# Patient Record
Sex: MALE | URBAN - METROPOLITAN AREA
[De-identification: names, ages, dates, MRNs, and addresses within clinical notes are randomized per-mention and may not be internally consistent; named-entity substitution may affect disease eponyms.]

---

## 2022-01-04 ENCOUNTER — OFFICE VISIT (OUTPATIENT)
Dept: URBAN - METROPOLITAN AREA CLINIC 35 | Facility: CLINIC | Age: 27
End: 2022-01-04
Payer: COMMERCIAL

## 2022-01-04 VITALS
HEIGHT: 68 IN | OXYGEN SATURATION: 100 % | HEART RATE: 124 BPM | WEIGHT: 155 LBS | BODY MASS INDEX: 23.49 KG/M2 | SYSTOLIC BLOOD PRESSURE: 128 MMHG | DIASTOLIC BLOOD PRESSURE: 78 MMHG

## 2022-01-04 DIAGNOSIS — R10.84 GENERALIZED ABDOMINAL PAIN: ICD-10-CM

## 2022-01-04 DIAGNOSIS — R19.7 ACUTE DIARRHEA: ICD-10-CM

## 2022-01-04 PROCEDURE — 99243 OFF/OP CNSLTJ NEW/EST LOW 30: CPT | Performed by: INTERNAL MEDICINE

## 2022-01-04 PROCEDURE — 99203 OFFICE O/P NEW LOW 30 MIN: CPT | Performed by: INTERNAL MEDICINE

## 2022-01-04 RX ORDER — CLOMIPRAMINE HYDROCHLORIDE 75 MG/1
1 CAPSULE AT BEDTIME CAPSULE ORAL ONCE A DAY
Status: ACTIVE | COMMUNITY

## 2022-01-04 RX ORDER — DICYCLOMINE HYDROCHLORIDE 20 MG/1
1 TABLET TABLET ORAL THREE TIMES A DAY
Qty: 45 TABLET | Refills: 1 | OUTPATIENT
Start: 2022-01-05 | End: 2022-02-04

## 2022-01-04 NOTE — HPI-ULCERATIVE COLITIS
26 year old male patient presents for ulcerative colitis consult to establish new care. Patient states he was diagnosed in [place] 3 years ago . He denies currently taking medications . He admits trying a medication similar to Mesalamine which he had bad reactions/ side effects with ; also he was on a medication similar to Sulfasalzine along with antiemetic which helped . Patient is currently taking medication for anxiety (clomipramine ) which he states helps frequency of bowel movements .  He admits his last colonoscopy was 2018 revealing  Mild chronic pancolitis . Admits  2-3 bowel movements a day with loose stools . He denies rectal bleeding , melena or mucus in stool.

## 2022-01-04 NOTE — HPI-DYSPEPSIA
Patient complains of belching and gas . Patient admits symptoms are more present during /after  meals . Patient admits symptoms have been present for many years .  Patient admits trying  H2 blocker while in [place]  to relieve symptoms with good success . Patient describes symptoms as excess belching,  pain above belly button, and sometimes regurgitation .   Patient denies having any recent breath test including H. Pylori or SIBO ..

## 2022-01-05 ENCOUNTER — TELEPHONE ENCOUNTER (OUTPATIENT)
Dept: URBAN - METROPOLITAN AREA CLINIC 35 | Facility: CLINIC | Age: 27
End: 2022-01-05

## 2022-01-27 ENCOUNTER — TELEPHONE ENCOUNTER (OUTPATIENT)
Dept: URBAN - METROPOLITAN AREA CLINIC 36 | Facility: CLINIC | Age: 27
End: 2022-01-27

## 2022-01-27 RX ORDER — AMITRIPTYLINE HYDROCHLORIDE 10 MG/1
1 TABLET AT BEDTIME TABLET, FILM COATED ORAL ONCE A DAY
Qty: 30 | Refills: 2 | OUTPATIENT
Start: 2022-01-27

## 2022-01-27 RX ORDER — HYOSCYAMINE SULFATE 0.125 MG
1 TABLET ON THE TONGUE AND ALLOW TO DISSOLVE  AS NEEDED TABLET,DISINTEGRATING ORAL THREE TIMES A DAY
Qty: 15 | Refills: 2 | OUTPATIENT
Start: 2022-01-27

## 2022-02-17 ENCOUNTER — OFFICE VISIT (OUTPATIENT)
Dept: URBAN - METROPOLITAN AREA CLINIC 35 | Facility: CLINIC | Age: 27
End: 2022-02-17

## 2022-02-22 ENCOUNTER — DASHBOARD ENCOUNTERS (OUTPATIENT)
Age: 27
End: 2022-02-22

## 2022-02-22 ENCOUNTER — OFFICE VISIT (OUTPATIENT)
Dept: URBAN - METROPOLITAN AREA CLINIC 35 | Facility: CLINIC | Age: 27
End: 2022-02-22
Payer: COMMERCIAL

## 2022-02-22 VITALS — HEART RATE: 120 BPM | OXYGEN SATURATION: 99 % | HEIGHT: 68 IN | WEIGHT: 155 LBS | BODY MASS INDEX: 23.49 KG/M2

## 2022-02-22 DIAGNOSIS — F41.9 ANXIETY: ICD-10-CM

## 2022-02-22 DIAGNOSIS — K21.9 GASTROESOPHAGEAL REFLUX DISEASE WITHOUT ESOPHAGITIS: ICD-10-CM

## 2022-02-22 DIAGNOSIS — R10.84 GENERALIZED ABDOMINAL PAIN: ICD-10-CM

## 2022-02-22 DIAGNOSIS — K59.1 FUNCTIONAL DIARRHEA: ICD-10-CM

## 2022-02-22 PROBLEM — 102614006: Status: ACTIVE | Noted: 2022-02-22

## 2022-02-22 PROBLEM — 48694002: Status: ACTIVE | Noted: 2022-02-22

## 2022-02-22 PROBLEM — 47812002: Status: ACTIVE | Noted: 2022-02-22

## 2022-02-22 PROBLEM — 266435005: Status: ACTIVE | Noted: 2022-02-22

## 2022-02-22 PROCEDURE — 99214 OFFICE O/P EST MOD 30 MIN: CPT | Performed by: INTERNAL MEDICINE

## 2022-02-22 RX ORDER — CLOMIPRAMINE HYDROCHLORIDE 75 MG/1
1 CAPSULE AT BEDTIME CAPSULE ORAL ONCE A DAY
Status: ON HOLD | COMMUNITY

## 2022-02-22 RX ORDER — HYOSCYAMINE SULFATE 0.125 MG
1 TABLET ON THE TONGUE AND ALLOW TO DISSOLVE  AS NEEDED TABLET,DISINTEGRATING ORAL THREE TIMES A DAY
Qty: 15 | Refills: 2 | Status: ACTIVE | COMMUNITY
Start: 2022-01-27

## 2022-02-22 RX ORDER — AMITRIPTYLINE HYDROCHLORIDE 10 MG/1
1 TABLET AT BEDTIME TABLET, FILM COATED ORAL ONCE A DAY
Qty: 30 | Refills: 2 | Status: ACTIVE | COMMUNITY
Start: 2022-01-27

## 2022-04-14 ENCOUNTER — TELEPHONE ENCOUNTER (OUTPATIENT)
Dept: URBAN - METROPOLITAN AREA CLINIC 36 | Facility: CLINIC | Age: 27
End: 2022-04-14

## 2022-04-19 ENCOUNTER — TELEPHONE ENCOUNTER (OUTPATIENT)
Dept: URBAN - METROPOLITAN AREA CLINIC 36 | Facility: CLINIC | Age: 27
End: 2022-04-19

## 2022-04-22 ENCOUNTER — TELEPHONE ENCOUNTER (OUTPATIENT)
Dept: URBAN - METROPOLITAN AREA CLINIC 35 | Facility: CLINIC | Age: 27
End: 2022-04-22

## 2022-05-24 ENCOUNTER — OFFICE VISIT (OUTPATIENT)
Dept: URBAN - METROPOLITAN AREA CLINIC 35 | Facility: CLINIC | Age: 27
End: 2022-05-24